# Patient Record
Sex: FEMALE | Race: OTHER | NOT HISPANIC OR LATINO
[De-identification: names, ages, dates, MRNs, and addresses within clinical notes are randomized per-mention and may not be internally consistent; named-entity substitution may affect disease eponyms.]

---

## 2017-05-26 ENCOUNTER — TRANSCRIPTION ENCOUNTER (OUTPATIENT)
Age: 82
End: 2017-05-26

## 2017-05-26 ENCOUNTER — INPATIENT (INPATIENT)
Facility: HOSPITAL | Age: 82
LOS: 3 days | Discharge: HOME | End: 2017-05-30
Attending: INTERNAL MEDICINE | Admitting: INTERNAL MEDICINE

## 2017-05-26 ENCOUNTER — RESULT REVIEW (OUTPATIENT)
Age: 82
End: 2017-05-26

## 2017-05-26 ENCOUNTER — APPOINTMENT (OUTPATIENT)
Dept: INTERVENTIONAL RADIOLOGY/VASCULAR | Facility: HOSPITAL | Age: 82
End: 2017-05-26

## 2017-05-26 ENCOUNTER — OUTPATIENT (OUTPATIENT)
Dept: OUTPATIENT SERVICES | Facility: HOSPITAL | Age: 82
LOS: 1 days | Discharge: HOME | End: 2017-05-26
Payer: OTHER MISCELLANEOUS

## 2017-05-26 VITALS
OXYGEN SATURATION: 93 % | RESPIRATION RATE: 16 BRPM | TEMPERATURE: 99 F | HEIGHT: 57 IN | DIASTOLIC BLOOD PRESSURE: 80 MMHG | WEIGHT: 99.21 LBS | SYSTOLIC BLOOD PRESSURE: 157 MMHG | HEART RATE: 92 BPM

## 2017-05-26 DIAGNOSIS — Z43.1 ENCOUNTER FOR ATTENTION TO GASTROSTOMY: ICD-10-CM

## 2017-05-26 PROBLEM — Z00.00 ENCOUNTER FOR PREVENTIVE HEALTH EXAMINATION: Status: ACTIVE | Noted: 2017-05-26

## 2017-05-26 LAB
ANION GAP SERPL CALC-SCNC: 9 MMOL/L — SIGNIFICANT CHANGE UP (ref 5–17)
BUN SERPL-MCNC: 46 MG/DL — HIGH (ref 7–23)
CALCIUM SERPL-MCNC: 9.4 MG/DL — SIGNIFICANT CHANGE UP (ref 8.5–10.1)
CHLORIDE SERPL-SCNC: 107 MMOL/L — SIGNIFICANT CHANGE UP (ref 96–108)
CO2 SERPL-SCNC: 29 MMOL/L — SIGNIFICANT CHANGE UP (ref 22–31)
CREAT SERPL-MCNC: 0.97 MG/DL — SIGNIFICANT CHANGE UP (ref 0.5–1.3)
GLUCOSE SERPL-MCNC: 120 MG/DL — HIGH (ref 70–99)
HCT VFR BLD CALC: 33.2 % — LOW (ref 34.5–45)
HGB BLD-MCNC: 11.3 G/DL — LOW (ref 11.5–15.5)
INR BLD: 1.16 RATIO — SIGNIFICANT CHANGE UP (ref 0.88–1.16)
MCHC RBC-ENTMCNC: 31.2 PG — SIGNIFICANT CHANGE UP (ref 27–34)
MCHC RBC-ENTMCNC: 33.9 GM/DL — SIGNIFICANT CHANGE UP (ref 32–36)
MCV RBC AUTO: 92 FL — SIGNIFICANT CHANGE UP (ref 80–100)
PLATELET # BLD AUTO: 224 K/UL — SIGNIFICANT CHANGE UP (ref 150–400)
POTASSIUM SERPL-MCNC: 3.9 MMOL/L — SIGNIFICANT CHANGE UP (ref 3.5–5.3)
POTASSIUM SERPL-SCNC: 3.9 MMOL/L — SIGNIFICANT CHANGE UP (ref 3.5–5.3)
PROTHROM AB SERPL-ACNC: 12.7 SEC — SIGNIFICANT CHANGE UP (ref 9.8–12.7)
RBC # BLD: 3.61 M/UL — LOW (ref 3.8–5.2)
RBC # FLD: 14 % — SIGNIFICANT CHANGE UP (ref 11–15)
SODIUM SERPL-SCNC: 145 MMOL/L — SIGNIFICANT CHANGE UP (ref 135–145)
WBC # BLD: 9.6 K/UL — SIGNIFICANT CHANGE UP (ref 3.8–10.5)
WBC # FLD AUTO: 9.6 K/UL — SIGNIFICANT CHANGE UP (ref 3.8–10.5)

## 2017-05-26 PROCEDURE — 49450 REPLACE G/C TUBE PERC: CPT

## 2017-05-26 RX ORDER — HYDROMORPHONE HYDROCHLORIDE 2 MG/ML
2 INJECTION INTRAMUSCULAR; INTRAVENOUS; SUBCUTANEOUS EVERY 4 HOURS
Qty: 0 | Refills: 0 | Status: DISCONTINUED | OUTPATIENT
Start: 2017-05-26 | End: 2017-05-30

## 2017-05-26 RX ORDER — ROBINUL 0.2 MG/ML
0.2 INJECTION INTRAMUSCULAR; INTRAVENOUS EVERY 8 HOURS
Qty: 0 | Refills: 0 | Status: DISCONTINUED | OUTPATIENT
Start: 2017-05-26 | End: 2017-05-30

## 2017-05-26 RX ORDER — NYSTATIN CREAM 100000 [USP'U]/G
1 CREAM TOPICAL
Qty: 0 | Refills: 0 | Status: DISCONTINUED | OUTPATIENT
Start: 2017-05-26 | End: 2017-05-30

## 2017-05-26 RX ORDER — ACETAMINOPHEN 500 MG
650 TABLET ORAL EVERY 6 HOURS
Qty: 0 | Refills: 0 | Status: DISCONTINUED | OUTPATIENT
Start: 2017-05-26 | End: 2017-05-27

## 2017-05-26 RX ORDER — SODIUM CHLORIDE 9 MG/ML
1000 INJECTION, SOLUTION INTRAVENOUS
Qty: 0 | Refills: 0 | Status: DISCONTINUED | OUTPATIENT
Start: 2017-05-26 | End: 2017-05-26

## 2017-05-26 RX ADMIN — NYSTATIN CREAM 1 APPLICATION(S): 100000 CREAM TOPICAL at 17:14

## 2017-05-26 RX ADMIN — SODIUM CHLORIDE 73 MILLILITER(S): 9 INJECTION, SOLUTION INTRAVENOUS at 15:36

## 2017-05-26 NOTE — PROGRESS NOTE ADULT - ASSESSMENT
84 year old female transferrered from [facility] for replacement of of PEG tube.  Patietn was apparently seen in IR and there was a complication involving the tube.  Patient is being admitted for retrieval of broken piece and replacement of  tube.  No medical contraindication to proposed procedure.

## 2017-05-26 NOTE — PROGRESS NOTE ADULT - SUBJECTIVE AND OBJECTIVE BOX
Upper esophagogastroduodenoscopy/ENDOSCOPY  with foreign body removal    -Informed consent obtained from patient prior to exam.     Indication:   Foreign body in stomach    Anesthesia: as per anesthesia  provided by:    [employer]:  RAMÍREZ    Stomach:   Foreign body s/p removal    Duodenum:   WNL    The patient tolerated the procedure well.    Findings:  as above    Plan:  Restart feeds

## 2017-05-26 NOTE — CONSULT NOTE ADULT - SUBJECTIVE AND OBJECTIVE BOX
HPI:  Senile degeneration of brain  Severe sepsis with septic shock    PAST MEDICAL & SURGICAL HISTORY:  Diabetes  COPD (chronic obstructive pulmonary disease)      MEDICATIONS  (STANDING):  nystatin Powder 1Application(s) Topical two times a day    MEDICATIONS  (PRN):  HYDROmorphone   Tablet 2milliGRAM(s) Oral every 4 hours PRN pain  acetaminophen  Suppository 650milliGRAM(s) Rectal every 6 hours PRN For Temp greater than 38 C (100.4 F)  LORazepam   Injectable 0.25milliGRAM(s) IV Push every 12 hours PRN Agitation  glycopyrrolate Injectable 0.2milliGRAM(s) IV Push every 8 hours PRN increased secretions      Allergies    codeine (Rash)    Intolerances        FAMILY HISTORY:      REVIEW OF SYSTEMS:    CONSTITUTIONAL: No fever, weight loss, or fatigue  EYES: No eye pain, visual disturbances, or discharge  ENMT:  No difficulty hearing, tinnitus, vertigo; No sinus or throat pain  NECK: No pain or stiffness  BREASTS: No pain, masses, or nipple discharge  RESPIRATORY: No cough, wheezing, chills or hemoptysis; No shortness of breath  CARDIOVASCULAR: No chest pain, palpitations, dizziness, or leg swelling  GASTROINTESTINAL: No abdominal or epigastric pain. No nausea, vomiting, or hematemesis; No diarrhea or constipation. No melena or hematochezia.  GENITOURINARY: No dysuria, frequency, hematuria, or incontinence  NEUROLOGICAL: No headaches, memory loss, loss of strength, numbness, or tremors  SKIN: No itching, burning, rashes, or lesions   LYMPH NODES: No enlarged glands  ENDOCRINE: No heat or cold intolerance; No hair loss  MUSCULOSKELETAL: No joint pain or swelling; No muscle, back, or extremity pain  PSYCHIATRIC: No depression, anxiety, mood swings, or difficulty sleeping  HEME/LYMPH: No easy bruising, or bleeding gums  ALLERGY AND IMMUNOLOGIC: No hives or eczema          SOCIAL HISTORY:    FAMILY HISTORY:      Vital Signs Last 24 Hrs  T(C): 37.2, Max: 37.2 (05-26 @ 11:51)  T(F): 99, Max: 99 (05-26 @ 11:51)  HR: 92 (92 - 92)  BP: 157/80 (157/80 - 157/80)  BP(mean): --  RR: 16 (16 - 16)  SpO2: 93% (93% - 93%)    PHYSICAL EXAM:    GENERAL: NAD, well-groomed, well-developed  HEAD:  Atraumatic, Normocephalic  EYES: EOMI, PERRLA, conjunctiva and sclera clear  ENMT: No tonsillar erythema, exudates, or enlargement; Moist mucous membranes, Good dentition, No lesions  NECK: Supple, No JVD, Normal thyroid  NERVOUS SYSTEM:  Alert & Oriented X3, Good concentration; Motor Strength 5/5 B/L upper and lower extremities; DTRs 2+ intact and symmetric  CHEST/LUNG: Clear to percussion bilaterally; No rales, rhonchi, wheezing, or rubs  HEART: Regular rate and rhythm; No murmurs, rubs, or gallops  ABDOMEN: Soft, Nontender, Nondistended; Bowel sounds present  EXTREMITIES:  2+ Peripheral Pulses, No clubbing, cyanosis, or edema  LYMPH: No lymphadenopathy noted   RECTAL: No masses, prostate normal size and smooth, Guaiaci negative   BREAST: No palpable masses, skin no lesions no retractions, no discharges. adnexal no palpable masses noted   GYN: uterus normal size, adnexal, no palpable masses, no CMT, no uterine discharge   SKIN: No rashes or lesions    LABS:                        11.3   9.6   )-----------( 224      ( 26 May 2017 12:33 )             33.2       CBC:  05-26 @ 12:33  WBC  9.6  HGB 11.3  HCT 33.2 Plate 224  MCV 92.0           26 May 2017 12:33    145    |  107    |  46     ----------------------------<  120    3.9     |  29     |  0.97     Ca    9.4        26 May 2017 12:33      PT/INR - ( 26 May 2017 12:33 )   PT: 12.7 sec;   INR: 1.16 ratio                 RADIOLOGY & ADDITIONAL STUDIES: HPI:  Senile degeneration of brain  Severe sepsis with septic shock    The patient was having her original PEG tube removed when the tube broke and the bumper fell into the stomach.    PAST MEDICAL & SURGICAL HISTORY:  Diabetes  COPD (chronic obstructive pulmonary disease)      MEDICATIONS  (STANDING):  nystatin Powder 1Application(s) Topical two times a day    MEDICATIONS  (PRN):  HYDROmorphone   Tablet 2milliGRAM(s) Oral every 4 hours PRN pain  acetaminophen  Suppository 650milliGRAM(s) Rectal every 6 hours PRN For Temp greater than 38 C (100.4 F)  LORazepam   Injectable 0.25milliGRAM(s) IV Push every 12 hours PRN Agitation  glycopyrrolate Injectable 0.2milliGRAM(s) IV Push every 8 hours PRN increased secretions      Allergies    codeine (Rash)    Intolerances        FAMILY HISTORY:      REVIEW OF SYSTEMS:  Patient not communicative.    CONSTITUTIONAL: No fever, weight loss, or fatigue  EYES: No eye pain, visual disturbances, or discharge  ENMT:  No difficulty hearing, tinnitus, vertigo; No sinus or throat pain  NECK: No pain or stiffness  BREASTS: No pain, masses, or nipple discharge  RESPIRATORY: No cough, wheezing, chills or hemoptysis; No shortness of breath  CARDIOVASCULAR: No chest pain, palpitations, dizziness, or leg swelling  GASTROINTESTINAL: No abdominal or epigastric pain. No nausea, vomiting, or hematemesis; No diarrhea or constipation. No melena or hematochezia.  GENITOURINARY: No dysuria, frequency, hematuria, or incontinence  NEUROLOGICAL: No headaches, memory loss, loss of strength, numbness, or tremors  SKIN: No itching, burning, rashes, or lesions   LYMPH NODES: No enlarged glands  ENDOCRINE: No heat or cold intolerance; No hair loss  MUSCULOSKELETAL: No joint pain or swelling; No muscle, back, or extremity pain  PSYCHIATRIC: No depression, anxiety, mood swings, or difficulty sleeping  HEME/LYMPH: No easy bruising, or bleeding gums  ALLERGY AND IMMUNOLOGIC: No hives or eczema          SOCIAL HISTORY:    FAMILY HISTORY:      Vital Signs Last 24 Hrs  T(C): 37.2, Max: 37.2 (05-26 @ 11:51)  T(F): 99, Max: 99 (05-26 @ 11:51)  HR: 92 (92 - 92)  BP: 157/80 (157/80 - 157/80)  BP(mean): --  RR: 16 (16 - 16)  SpO2: 93% (93% - 93%)    PHYSICAL EXAM:    GENERAL: NAD, well-groomed, well-developed  HEAD:  Atraumatic, Normocephalic  EYES: EOMI, PERRLA, conjunctiva and sclera clear  ENMT: No tonsillar erythema, exudates, or enlargement; Moist mucous membranes, Good dentition, No lesions  NECK: Supple, No JVD, Normal thyroid  NERVOUS SYSTEM:  Alert & Oriented X3, Good concentration; Motor Strength 5/5 B/L upper and lower extremities; DTRs 2+ intact and symmetric  CHEST/LUNG: Clear to percussion bilaterally; No rales, rhonchi, wheezing, or rubs  HEART: Regular rate and rhythm; No murmurs, rubs, or gallops  ABDOMEN: Soft, Nontender, Nondistended; Bowel sounds present  EXTREMITIES:  2+ Peripheral Pulses, No clubbing, cyanosis, or edema  LYMPH: No lymphadenopathy noted   RECTAL: Deferred  BREAST: No palpable masses, skin no lesions    SKIN: No rashes or lesions    LABS:                        11.3   9.6   )-----------( 224      ( 26 May 2017 12:33 )             33.2       CBC:  05-26 @ 12:33  WBC  9.6  HGB 11.3  HCT 33.2 Plate 224  MCV 92.0           26 May 2017 12:33    145    |  107    |  46     ----------------------------<  120    3.9     |  29     |  0.97     Ca    9.4        26 May 2017 12:33      PT/INR - ( 26 May 2017 12:33 )   PT: 12.7 sec;   INR: 1.16 ratio                 RADIOLOGY & ADDITIONAL STUDIES:

## 2017-05-26 NOTE — PROGRESS NOTE ADULT - SUBJECTIVE AND OBJECTIVE BOX
LUPE ORONA                    84y  Female    Allergies    codeine (Rash)    Intolerances        Symptoms:  Pain (1-10):  Dyspnea:  Nausea/Vomiting:  Secretions:   Agitation:  Symptom Requiring Inpatient Hospice Admission:    Overnight events/interim history:    HPI:          PPSV2:     Code Status:          MEDICATIONS  (STANDING):    MEDICATIONS  (PRN):                             Vital Signs Last 24 Hrs  T(C): --  T(F): --  HR: --  BP: --  BP(mean): --  RR: --  SpO2: --    PHYSICAL EXAM:      Constitutional:    Eyes:    ENMT:    Neck:    Breasts:    Back:    Respiratory:    Cardiovascular:    Gastrointestinal:    Genitourinary:    Rectal:    Extremities:    Vascular:    Neurological:    Skin:    Lymph Nodes:    Musculoskeletal:    Psychiatric:

## 2017-05-27 DIAGNOSIS — F03.90 UNSPECIFIED DEMENTIA, UNSPECIFIED SEVERITY, WITHOUT BEHAVIORAL DISTURBANCE, PSYCHOTIC DISTURBANCE, MOOD DISTURBANCE, AND ANXIETY: ICD-10-CM

## 2017-05-27 DIAGNOSIS — T18.2XXA FOREIGN BODY IN STOMACH, INITIAL ENCOUNTER: ICD-10-CM

## 2017-05-27 DIAGNOSIS — R50.9 FEVER, UNSPECIFIED: ICD-10-CM

## 2017-05-27 RX ORDER — SENNA PLUS 8.6 MG/1
10 TABLET ORAL
Qty: 0 | Refills: 0 | Status: DISCONTINUED | OUTPATIENT
Start: 2017-05-27 | End: 2017-05-30

## 2017-05-27 RX ORDER — ROBINUL 0.2 MG/ML
0.2 INJECTION INTRAMUSCULAR; INTRAVENOUS
Qty: 0 | Refills: 0 | Status: DISCONTINUED | OUTPATIENT
Start: 2017-05-27 | End: 2017-05-29

## 2017-05-27 RX ORDER — ACETAMINOPHEN 500 MG
650 TABLET ORAL EVERY 6 HOURS
Qty: 0 | Refills: 0 | Status: DISCONTINUED | OUTPATIENT
Start: 2017-05-27 | End: 2017-05-30

## 2017-05-27 RX ORDER — INSULIN GLARGINE 100 [IU]/ML
10 INJECTION, SOLUTION SUBCUTANEOUS EVERY MORNING
Qty: 0 | Refills: 0 | Status: DISCONTINUED | OUTPATIENT
Start: 2017-05-27 | End: 2017-05-30

## 2017-05-27 RX ADMIN — NYSTATIN CREAM 1 APPLICATION(S): 100000 CREAM TOPICAL at 05:35

## 2017-05-27 RX ADMIN — NYSTATIN CREAM 1 APPLICATION(S): 100000 CREAM TOPICAL at 17:25

## 2017-05-27 RX ADMIN — SENNA PLUS 10 MILLILITER(S): 8.6 TABLET ORAL at 18:26

## 2017-05-27 RX ADMIN — Medication 650 MILLIGRAM(S): at 05:57

## 2017-05-27 RX ADMIN — INSULIN GLARGINE 10 UNIT(S): 100 INJECTION, SOLUTION SUBCUTANEOUS at 09:21

## 2017-05-27 NOTE — H&P ADULT - HISTORY OF PRESENT ILLNESS
84 year old female with dementia and history of sepsis brought in for removal of foreign body from stomach and replacement of PEG tube.

## 2017-05-27 NOTE — PROGRESS NOTE ADULT - ASSESSMENT
84 year old with dementia and history of sepsis brought in for PEG change which was done but patient spiking fevers to 101 post procedure.  On Levaquin

## 2017-05-27 NOTE — PROGRESS NOTE ADULT - SUBJECTIVE AND OBJECTIVE BOX
LUPE ORONA                    84y  Female    Allergies    codeine (Rash)    Intolerances        Symptoms:  Pain (1-10):  none  Dyspnea: no  Nausea/Vomiting: no  Secretions:  no  Agitation: no  Symptom Requiring Inpatient Hospice Admission: fever post PEG change    Overnight events/interim history:    HPI:  84 year old female with dementia and history of sepsis brought in for removal of foreign body from stomach and replacement of PEG tube. (27 May 2017 06:21)          PPSV2:     Code Status:  DNR          MEDICATIONS  (STANDING):  nystatin Powder 1Application(s) Topical two times a day  levoFLOXacin  Tablet 250milliGRAM(s) Oral every 24 hours  insulin glargine Injectable (LANTUS) 10Unit(s) SubCutaneous every morning  senna Syrup 10milliLiter(s) Oral two times a day    MEDICATIONS  (PRN):  HYDROmorphone   Tablet 2milliGRAM(s) Oral every 4 hours PRN pain  glycopyrrolate Injectable 0.2milliGRAM(s) IV Push every 8 hours PRN increased secretions  acetaminophen    Suspension 650milliGRAM(s) Oral every 6 hours PRN For Temp greater than 38 C (100.4 F)  LORazepam    Concentrate 0.25milliGRAM(s) Oral two times a day PRN Agitation  glycopyrrolate 0.2milliGRAM(s) Oral two times a day PRN increased secretions  sodium biphosphate Rectal Enema 1Enema Rectal daily PRN constipation      CBC Full  -  ( 26 May 2017 12:33 )  WBC Count : 9.6 K/uL  Hemoglobin : 11.3 g/dL  Hematocrit : 33.2 %  Platelet Count - Automated : 224 K/uL  Mean Cell Volume : 92.0 fl  Mean Cell Hemoglobin : 31.2 pg  Mean Cell Hemoglobin Concentration : 33.9 gm/dL  Auto Neutrophil # : x  Auto Lymphocyte # : x  Auto Monocyte # : x  Auto Eosinophil # : x  Auto Basophil # : x  Auto Neutrophil % : x  Auto Lymphocyte % : x  Auto Monocyte % : x  Auto Eosinophil % : x  Auto Basophil % : x                         Vital Signs Last 24 Hrs  T(C): 38.7, Max: 38.7 (05-27 @ 07:29)  T(F): 101.7, Max: 101.7 (05-27 @ 07:29)  HR: 90 (68 - 92)  BP: 154/82 (91/48 - 186/93)  BP(mean): --  RR: 17 (16 - 26)  SpO2: 97% (93% - 100%)    PHYSICAL EXAM:      Constitutional: frail    Eyes: shut    ENMT: wnl    Neck: supple    Breasts: not examined    Back: reddened    Respiratory: grossly clear    Cardiovascular: regular    Gastrointestinal: PEG in place    Genitourinary: not examined    Rectal: not examined    Extremities: wnl    Vascular: wnl    Neurological: lethargic non verbal    Skin: dry    Lymph Nodes: none palpable    Musculoskeletal: weakness    Psychiatric:NA

## 2017-05-28 LAB
HBA1C BLD-MCNC: 6.1 % — HIGH (ref 4–5.6)
HCT VFR BLD CALC: 30.7 % — LOW (ref 34.5–45)
HGB BLD-MCNC: 10.5 G/DL — LOW (ref 11.5–15.5)
MCHC RBC-ENTMCNC: 31.5 PG — SIGNIFICANT CHANGE UP (ref 27–34)
MCHC RBC-ENTMCNC: 34.1 GM/DL — SIGNIFICANT CHANGE UP (ref 32–36)
MCV RBC AUTO: 92.3 FL — SIGNIFICANT CHANGE UP (ref 80–100)
PLATELET # BLD AUTO: 240 K/UL — SIGNIFICANT CHANGE UP (ref 150–400)
RBC # BLD: 3.33 M/UL — LOW (ref 3.8–5.2)
RBC # FLD: 13.8 % — SIGNIFICANT CHANGE UP (ref 11–15)
WBC # BLD: 10.7 K/UL — HIGH (ref 3.8–10.5)
WBC # FLD AUTO: 10.7 K/UL — HIGH (ref 3.8–10.5)

## 2017-05-28 RX ADMIN — NYSTATIN CREAM 1 APPLICATION(S): 100000 CREAM TOPICAL at 05:20

## 2017-05-28 RX ADMIN — NYSTATIN CREAM 1 APPLICATION(S): 100000 CREAM TOPICAL at 17:45

## 2017-05-28 RX ADMIN — SENNA PLUS 10 MILLILITER(S): 8.6 TABLET ORAL at 17:43

## 2017-05-28 RX ADMIN — Medication 650 MILLIGRAM(S): at 07:56

## 2017-05-28 RX ADMIN — SENNA PLUS 10 MILLILITER(S): 8.6 TABLET ORAL at 05:19

## 2017-05-28 RX ADMIN — INSULIN GLARGINE 10 UNIT(S): 100 INJECTION, SOLUTION SUBCUTANEOUS at 08:26

## 2017-05-28 RX ADMIN — Medication 650 MILLIGRAM(S): at 14:12

## 2017-05-28 NOTE — PROGRESS NOTE ADULT - SUBJECTIVE AND OBJECTIVE BOX
LUPE ORONA                    84y  Female    Allergies    codeine (Rash)    Intolerances        Symptoms:  Pain (1-10): no  Dyspnea:  no  Nausea/Vomiting: no  Secretions: no  Agitation: no  Symptom Requiring Inpatient Hospice Admission: recurrent fevers    Overnight events/interim history:    HPI:  84 year old female with dementia and history of sepsis brought in for removal of foreign body from stomach and replacement of PEG tube. (27 May 2017 06:21)          PPSV2:     Code Status: DNR          MEDICATIONS  (STANDING):  nystatin Powder 1Application(s) Topical two times a day  levoFLOXacin  Tablet 250milliGRAM(s) Oral every 24 hours  insulin glargine Injectable (LANTUS) 10Unit(s) SubCutaneous every morning  senna Syrup 10milliLiter(s) Oral two times a day    MEDICATIONS  (PRN):  HYDROmorphone   Tablet 2milliGRAM(s) Oral every 4 hours PRN pain  glycopyrrolate Injectable 0.2milliGRAM(s) IV Push every 8 hours PRN increased secretions  acetaminophen    Suspension 650milliGRAM(s) Oral every 6 hours PRN For Temp greater than 38 C (100.4 F)  LORazepam    Concentrate 0.25milliGRAM(s) Oral two times a day PRN Agitation  glycopyrrolate 0.2milliGRAM(s) Oral two times a day PRN increased secretions  sodium biphosphate Rectal Enema 1Enema Rectal daily PRN constipation      CBC Full  -  ( 26 May 2017 12:33 )  WBC Count : 9.6 K/uL  Hemoglobin : 11.3 g/dL  Hematocrit : 33.2 %  Platelet Count - Automated : 224 K/uL  Mean Cell Volume : 92.0 fl  Mean Cell Hemoglobin : 31.2 pg  Mean Cell Hemoglobin Concentration : 33.9 gm/dL  Auto Neutrophil # : x  Auto Lymphocyte # : x  Auto Monocyte # : x  Auto Eosinophil # : x  Auto Basophil # : x  Auto Neutrophil % : x  Auto Lymphocyte % : x  Auto Monocyte % : x  Auto Eosinophil % : x  Auto Basophil % : x                         Vital Signs Last 24 Hrs  T(C): 38.4, Max: 38.4 (05-28 @ 07:47)  T(F): 101.2, Max: 101.2 (05-28 @ 07:47)  HR: 85 (78 - 85)  BP: 154/73 (154/73 - 156/88)  BP(mean): --  RR: 16 (16 - 16)  SpO2: 100% (99% - 100%)    PHYSICAL EXAM:      Constitutional:  frail    Eyes: shut    ENMT: dry    Neck: contracted    Breasts:  not examined    Back: wnl    Respiratory: grossly clear    Cardiovascular:regular    Gastrointestinal: PEG in place    Genitourinary: not examined    Rectal: not examined    Extremities: contracted    Vascular:    Neurological: lethargic    Skin: dry    Lymph Nodes: none palpable    Musculoskeletal: weakness    Psychiatric: NA

## 2017-05-29 RX ADMIN — SENNA PLUS 10 MILLILITER(S): 8.6 TABLET ORAL at 05:21

## 2017-05-29 RX ADMIN — NYSTATIN CREAM 1 APPLICATION(S): 100000 CREAM TOPICAL at 17:20

## 2017-05-29 RX ADMIN — NYSTATIN CREAM 1 APPLICATION(S): 100000 CREAM TOPICAL at 05:21

## 2017-05-29 RX ADMIN — INSULIN GLARGINE 10 UNIT(S): 100 INJECTION, SOLUTION SUBCUTANEOUS at 08:00

## 2017-05-29 RX ADMIN — SENNA PLUS 10 MILLILITER(S): 8.6 TABLET ORAL at 17:29

## 2017-05-29 NOTE — PROGRESS NOTE ADULT - SUBJECTIVE AND OBJECTIVE BOX
LUPE ORONA                    84y  Female    Allergies    codeine (Rash)    Intolerances        Symptoms:  Pain (1-10): no  Dyspnea: on oxygen  Nausea/Vomiting: no  Secretions:  no  Agitation: no  Symptom Requiring Inpatient Hospice Admission: was febrile after procedure     Overnight events/interim history:    HPI:  84 year old female with dementia and history of sepsis brought in for removal of foreign body from stomach and replacement of PEG tube. (27 May 2017 06:21)          PPSV2:     Code Status: DNR          MEDICATIONS  (STANDING):  nystatin Powder 1Application(s) Topical two times a day  levoFLOXacin  Tablet 250milliGRAM(s) Oral every 24 hours  insulin glargine Injectable (LANTUS) 10Unit(s) SubCutaneous every morning  senna Syrup 10milliLiter(s) Oral two times a day    MEDICATIONS  (PRN):  HYDROmorphone   Tablet 2milliGRAM(s) Oral every 4 hours PRN pain  glycopyrrolate Injectable 0.2milliGRAM(s) IV Push every 8 hours PRN increased secretions  acetaminophen    Suspension 650milliGRAM(s) Oral every 6 hours PRN For Temp greater than 38 C (100.4 F)  LORazepam    Concentrate 0.25milliGRAM(s) Oral two times a day PRN Agitation  sodium biphosphate Rectal Enema 1Enema Rectal daily PRN constipation      CBC Full  -  ( 28 May 2017 10:14 )  WBC Count : 10.7 K/uL  Hemoglobin : 10.5 g/dL  Hematocrit : 30.7 %  Platelet Count - Automated : 240 K/uL  Mean Cell Volume : 92.3 fl  Mean Cell Hemoglobin : 31.5 pg  Mean Cell Hemoglobin Concentration : 34.1 gm/dL  Auto Neutrophil # : x  Auto Lymphocyte # : x  Auto Monocyte # : x  Auto Eosinophil # : x  Auto Basophil # : x  Auto Neutrophil % : x  Auto Lymphocyte % : x  Auto Monocyte % : x  Auto Eosinophil % : x  Auto Basophil % : x                         Vital Signs Last 24 Hrs  T(C): 37.2, Max: 38.2 (05-28 @ 12:17)  T(F): 98.9, Max: 100.8 (05-28 @ 12:17)  HR: 91 (69 - 91)  BP: 165/88 (159/71 - 165/88)  BP(mean): --  RR: 18 (18 - 18)  SpO2: 99% (99% - 99%)    PHYSICAL EXAM:      Constitutional: frail    Eyes:  shut    ENMT: dry    Neck: supple    Breasts: not examined    Back: reddened    Respiratory: few coarsely transmitted breath sounds    Cardiovascular: regulaar    Gastrointestinal: abdomen soft PEG in place    Genitourinary: pop    Rectal:  not examined    Extremities: contracted    Vascular: not examined    Neurological: dementia    Skin: dry    Lymph Nodes: none palpable    Musculoskeletal: no strength    Psychiatric NA

## 2017-05-30 VITALS
OXYGEN SATURATION: 100 % | HEART RATE: 83 BPM | DIASTOLIC BLOOD PRESSURE: 82 MMHG | SYSTOLIC BLOOD PRESSURE: 147 MMHG | TEMPERATURE: 99 F | RESPIRATION RATE: 18 BRPM

## 2017-05-30 RX ORDER — NYSTATIN CREAM 100000 [USP'U]/G
1 CREAM TOPICAL
Qty: 0 | Refills: 0 | COMMUNITY
Start: 2017-05-30

## 2017-05-30 RX ORDER — HYDROMORPHONE HYDROCHLORIDE 2 MG/ML
2 INJECTION INTRAMUSCULAR; INTRAVENOUS; SUBCUTANEOUS
Qty: 0 | Refills: 0 | COMMUNITY
Start: 2017-05-30

## 2017-05-30 RX ORDER — INSULIN GLARGINE 100 [IU]/ML
10 INJECTION, SOLUTION SUBCUTANEOUS
Qty: 0 | Refills: 0 | COMMUNITY
Start: 2017-05-30

## 2017-05-30 RX ORDER — ACETAMINOPHEN 500 MG
650 TABLET ORAL
Qty: 0 | Refills: 0 | COMMUNITY
Start: 2017-05-30

## 2017-05-30 RX ORDER — SENNA PLUS 8.6 MG/1
5 TABLET ORAL
Qty: 0 | Refills: 0 | COMMUNITY
Start: 2017-05-30

## 2017-05-30 RX ORDER — ROBINUL 0.2 MG/ML
0.4 INJECTION INTRAMUSCULAR; INTRAVENOUS
Qty: 0 | Refills: 0 | COMMUNITY
Start: 2017-05-30

## 2017-05-30 RX ADMIN — SENNA PLUS 10 MILLILITER(S): 8.6 TABLET ORAL at 05:15

## 2017-05-30 RX ADMIN — INSULIN GLARGINE 10 UNIT(S): 100 INJECTION, SOLUTION SUBCUTANEOUS at 08:00

## 2017-05-30 RX ADMIN — NYSTATIN CREAM 1 APPLICATION(S): 100000 CREAM TOPICAL at 05:16

## 2017-05-30 NOTE — DISCHARGE NOTE ADULT - MEDICATION SUMMARY - MEDICATIONS TO CHANGE
I will SWITCH the dose or number of times a day I take the medications listed below when I get home from the hospital:    glycopyrrolate 1 mg/5 mL oral solution  -- 0.04 mg/kg by mouth 2 times a day, As Needed MDD:2ml  -- Check with your doctor before becoming pregnant.  May cause drowsiness.  Alcohol may intensify this effect.  Use care when operating dangerous machinery.  Obtain medical advice before taking any non-prescription drugs as some may affect the action of this medication.  Take medication on an empty stomach 1 hour before or 2 to 3 hours after a meal unless otherwise directed by your doctor.  This drug may impair the ability to drive or operate machinery.  Use care until you become familiar with its effects.

## 2017-05-30 NOTE — DISCHARGE NOTE ADULT - MEDICATION SUMMARY - MEDICATIONS TO TAKE
I will START or STAY ON the medications listed below when I get home from the hospital:    Dilaudid 2 mg oral tablet  --  by gastrostomy tube , As Needed for moderate pain 4/10-7/10  -- Indication: For For moderate pain as needed    Children's Pain & Fever 160 mg/5 mL oral suspension  -- 650 milligram(s) by mouth every 6 hours, As Needed fever > 100.4  -- Indication: For  for temp > 100.4 if needed    LORazepam 2 mg/mL oral concentrate  -- 0.25 milliliter(s) by gastrostomy tube every 12 hours, As Needed for agitation  -- Indication: For For agitatation as needed    Lantus 100 units/mL subcutaneous solution  -- 10 unit(s) subcutaneous once a day  -- Indication: For For diabetes    nystatin 100,000 units/g topical powder  -- Apply on skin to affected area 2 times a day to area under breasts  -- Indication: For For rash under breasts, as needed    glycopyrrolate  -- 0.4 milligram(s) by gastrostomy tube 2 times a day, As Needed  -- Indication: For For excessive terminal     senna leaf extract 176 mg/5 mL oral syrup  -- 5 milliliter(s) by gastrostomy tube 2 times a day  -- Indication: For For constipation

## 2017-05-30 NOTE — DISCHARGE NOTE ADULT - CARE PROVIDER_API CALL
Bob Bateman), Physician  NPs  [address] [place] [address]  Phone: [phone]  Fax: [phone]    Genia Gomez ([facility]), Physician  NPs  [address] [place] [address]  Phone: [phone]  Fax: [phone]

## 2017-05-30 NOTE — DISCHARGE NOTE ADULT - HOSPITAL COURSE
84 year old female with dementia and history of sepsis brought in to [facility] for removal of foreign body from stomach and replacement of PEG tube.

## 2017-05-30 NOTE — DISCHARGE NOTE ADULT - CARE PLAN
Principal Discharge DX:	Dementia  Goal:	You will be comfortable at home  Instructions for follow-up, activity and diet:	Hospice services continue for you at home.  Secondary Diagnosis:	Foreign body in stomach

## 2017-05-30 NOTE — DISCHARGE NOTE ADULT - PATIENT PORTAL LINK FT
“You can access the [facility] Patient Portal, offered by [facility], by registering with the following website: [email or web address]”

## 2017-05-30 NOTE — DISCHARGE NOTE ADULT - MEDICATION SUMMARY - MEDICATIONS TO STOP TAKING
I will STOP taking the medications listed below when I get home from the hospital:    methadone 10 mg/5 mL oral solution  -- 2.5 milliliter(s) by gastrostomy tube every 12 hours MDD:5ml

## 2017-05-31 LAB — SURGICAL PATHOLOGY FINAL REPORT - CH: SIGNIFICANT CHANGE UP

## 2017-06-05 DIAGNOSIS — J44.9 CHRONIC OBSTRUCTIVE PULMONARY DISEASE, UNSPECIFIED: ICD-10-CM

## 2017-06-05 DIAGNOSIS — E11.9 TYPE 2 DIABETES MELLITUS WITHOUT COMPLICATIONS: ICD-10-CM
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
## 2017-06-05 DIAGNOSIS — Z88.5 ALLERGY STATUS TO NARCOTIC AGENT: ICD-10-CM

## 2017-06-05 DIAGNOSIS — T81.509A UNSPECIFIED COMPLICATION OF FOREIGN BODY ACCIDENTALLY LEFT IN BODY FOLLOWING UNSPECIFIED PROCEDURE, INITIAL ENCOUNTER: ICD-10-CM

## 2017-06-05 DIAGNOSIS — Z66 DO NOT RESUSCITATE: ICD-10-CM

## 2017-06-05 DIAGNOSIS — R50.82 POSTPROCEDURAL FEVER: ICD-10-CM

## 2017-06-05 DIAGNOSIS — Z28.21 IMMUNIZATION NOT CARRIED OUT BECAUSE OF PATIENT REFUSAL: ICD-10-CM

## 2017-06-05 DIAGNOSIS — T81.9XXA UNSPECIFIED COMPLICATION OF PROCEDURE, INITIAL ENCOUNTER: ICD-10-CM
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
## 2017-06-05 DIAGNOSIS — F03.90 UNSPECIFIED DEMENTIA, UNSPECIFIED SEVERITY, WITHOUT BEHAVIORAL DISTURBANCE, PSYCHOTIC DISTURBANCE, MOOD DISTURBANCE, AND ANXIETY: ICD-10-CM

## 2017-11-15 ENCOUNTER — EMERGENCY (EMERGENCY)
Facility: HOSPITAL | Age: 82
LOS: 1 days | Discharge: HOME | End: 2017-11-15
Attending: EMERGENCY MEDICINE
Payer: MEDICARE

## 2017-11-15 VITALS
RESPIRATION RATE: 18 BRPM | WEIGHT: 119.93 LBS | SYSTOLIC BLOOD PRESSURE: 174 MMHG | DIASTOLIC BLOOD PRESSURE: 74 MMHG | HEART RATE: 60 BPM | TEMPERATURE: 98 F | HEIGHT: 58 IN

## 2017-11-15 DIAGNOSIS — K94.29 OTHER COMPLICATIONS OF GASTROSTOMY: ICD-10-CM

## 2017-11-15 DIAGNOSIS — E11.9 TYPE 2 DIABETES MELLITUS WITHOUT COMPLICATIONS: ICD-10-CM

## 2017-11-15 DIAGNOSIS — J44.9 CHRONIC OBSTRUCTIVE PULMONARY DISEASE, UNSPECIFIED: ICD-10-CM

## 2017-11-15 DIAGNOSIS — Z79.4 LONG TERM (CURRENT) USE OF INSULIN: ICD-10-CM

## 2017-11-15 DIAGNOSIS — Z88.5 ALLERGY STATUS TO NARCOTIC AGENT: ICD-10-CM

## 2017-11-15 PROCEDURE — 99283 EMERGENCY DEPT VISIT LOW MDM: CPT | Mod: 25

## 2017-11-15 PROCEDURE — 43760: CPT

## 2017-11-15 PROCEDURE — 99284 EMERGENCY DEPT VISIT MOD MDM: CPT | Mod: GV

## 2017-11-15 PROCEDURE — 43760 CHANGE GASTROSTOMY TUBE PERCUTANEOUS W/O GUIDE: CPT

## 2017-11-15 PROCEDURE — 74000: CPT | Mod: 26

## 2017-11-15 PROCEDURE — 74018 RADEX ABDOMEN 1 VIEW: CPT

## 2017-11-15 NOTE — ED PROVIDER NOTE - OBJECTIVE STATEMENT
85 y/o F pt (on hospice) w/ PMH ox COPD and DM was sent to ED because her feeding tube stopped working yesterday. Pt's family denies abd pain, vomiting, or any other complaints. Pt is at baseline per family. Pt is allergic to Codeine (Rash).

## 2018-03-25 ENCOUNTER — INPATIENT (INPATIENT)
Facility: HOSPITAL | Age: 83
LOS: 1 days | DRG: 192 | End: 2018-03-27
Attending: INTERNAL MEDICINE | Admitting: INTERNAL MEDICINE
Payer: OTHER MISCELLANEOUS

## 2018-03-25 VITALS
HEIGHT: 59 IN | RESPIRATION RATE: 18 BRPM | HEART RATE: 82 BPM | DIASTOLIC BLOOD PRESSURE: 60 MMHG | OXYGEN SATURATION: 100 % | WEIGHT: 134.92 LBS | SYSTOLIC BLOOD PRESSURE: 150 MMHG

## 2018-03-25 DIAGNOSIS — R50.9 FEVER, UNSPECIFIED: ICD-10-CM

## 2018-03-25 LAB
ALBUMIN SERPL ELPH-MCNC: 2.8 G/DL — LOW (ref 3.5–5)
ALP SERPL-CCNC: 48 U/L — SIGNIFICANT CHANGE UP (ref 40–120)
ALT FLD-CCNC: 13 U/L DA — SIGNIFICANT CHANGE UP (ref 10–60)
ANION GAP SERPL CALC-SCNC: 0 MMOL/L — LOW (ref 5–17)
APPEARANCE UR: ABNORMAL
AST SERPL-CCNC: 25 U/L — SIGNIFICANT CHANGE UP (ref 10–40)
BASOPHILS # BLD AUTO: 0.1 K/UL — SIGNIFICANT CHANGE UP (ref 0–0.2)
BASOPHILS NFR BLD AUTO: 0.9 % — SIGNIFICANT CHANGE UP (ref 0–2)
BILIRUB SERPL-MCNC: 0.2 MG/DL — SIGNIFICANT CHANGE UP (ref 0.2–1.2)
BILIRUB UR-MCNC: NEGATIVE — SIGNIFICANT CHANGE UP
BUN SERPL-MCNC: 77 MG/DL — HIGH (ref 7–18)
CALCIUM SERPL-MCNC: 9.3 MG/DL — SIGNIFICANT CHANGE UP (ref 8.4–10.5)
CHLORIDE SERPL-SCNC: 98 MMOL/L — SIGNIFICANT CHANGE UP (ref 96–108)
CO2 SERPL-SCNC: 39 MMOL/L — HIGH (ref 22–31)
COLOR SPEC: YELLOW — SIGNIFICANT CHANGE UP
CREAT SERPL-MCNC: 1.11 MG/DL — SIGNIFICANT CHANGE UP (ref 0.5–1.3)
DIFF PNL FLD: ABNORMAL
EOSINOPHIL # BLD AUTO: 0.1 K/UL — SIGNIFICANT CHANGE UP (ref 0–0.5)
EOSINOPHIL NFR BLD AUTO: 0.4 % — SIGNIFICANT CHANGE UP (ref 0–6)
GLUCOSE SERPL-MCNC: 185 MG/DL — HIGH (ref 70–99)
GLUCOSE UR QL: NEGATIVE — SIGNIFICANT CHANGE UP
HCT VFR BLD CALC: 27.5 % — LOW (ref 34.5–45)
HGB BLD-MCNC: 8 G/DL — LOW (ref 11.5–15.5)
KETONES UR-MCNC: NEGATIVE — SIGNIFICANT CHANGE UP
LEUKOCYTE ESTERASE UR-ACNC: ABNORMAL
LYMPHOCYTES # BLD AUTO: 1 K/UL — SIGNIFICANT CHANGE UP (ref 1–3.3)
LYMPHOCYTES # BLD AUTO: 7 % — LOW (ref 13–44)
MCHC RBC-ENTMCNC: 29 GM/DL — LOW (ref 32–36)
MCHC RBC-ENTMCNC: 29.6 PG — SIGNIFICANT CHANGE UP (ref 27–34)
MCV RBC AUTO: 101.8 FL — HIGH (ref 80–100)
MONOCYTES # BLD AUTO: 0.6 K/UL — SIGNIFICANT CHANGE UP (ref 0–0.9)
MONOCYTES NFR BLD AUTO: 4.6 % — SIGNIFICANT CHANGE UP (ref 2–14)
NEUTROPHILS # BLD AUTO: 12.1 K/UL — HIGH (ref 1.8–7.4)
NEUTROPHILS NFR BLD AUTO: 87 % — HIGH (ref 43–77)
NITRITE UR-MCNC: POSITIVE
PH UR: 5 — SIGNIFICANT CHANGE UP (ref 5–8)
PLATELET # BLD AUTO: 143 K/UL — LOW (ref 150–400)
POTASSIUM SERPL-MCNC: 5.4 MMOL/L — HIGH (ref 3.5–5.3)
POTASSIUM SERPL-SCNC: 5.4 MMOL/L — HIGH (ref 3.5–5.3)
PROT SERPL-MCNC: 8.1 G/DL — SIGNIFICANT CHANGE UP (ref 6–8.3)
PROT UR-MCNC: 100
RBC # BLD: 2.71 M/UL — LOW (ref 3.8–5.2)
RBC # FLD: 15.1 % — HIGH (ref 10.3–14.5)
SODIUM SERPL-SCNC: 137 MMOL/L — SIGNIFICANT CHANGE UP (ref 135–145)
SP GR SPEC: 1.01 — SIGNIFICANT CHANGE UP (ref 1.01–1.02)
TROPONIN I SERPL-MCNC: 0.05 NG/ML — HIGH (ref 0–0.04)
UROBILINOGEN FLD QL: NEGATIVE — SIGNIFICANT CHANGE UP
WBC # BLD: 13.9 K/UL — HIGH (ref 3.8–10.5)
WBC # FLD AUTO: 13.9 K/UL — HIGH (ref 3.8–10.5)

## 2018-03-25 PROCEDURE — 99285 EMERGENCY DEPT VISIT HI MDM: CPT

## 2018-03-25 PROCEDURE — 71045 X-RAY EXAM CHEST 1 VIEW: CPT | Mod: 26

## 2018-03-25 RX ORDER — ACETAMINOPHEN 500 MG
650 TABLET ORAL EVERY 6 HOURS
Qty: 0 | Refills: 0 | Status: DISCONTINUED | OUTPATIENT
Start: 2018-03-25 | End: 2018-03-27

## 2018-03-25 RX ORDER — CEFTRIAXONE 500 MG/1
1 INJECTION, POWDER, FOR SOLUTION INTRAMUSCULAR; INTRAVENOUS ONCE
Qty: 0 | Refills: 0 | Status: COMPLETED | OUTPATIENT
Start: 2018-03-25 | End: 2018-03-26

## 2018-03-25 RX ORDER — ROBINUL 0.2 MG/ML
0.2 INJECTION INTRAMUSCULAR; INTRAVENOUS EVERY 4 HOURS
Qty: 0 | Refills: 0 | Status: DISCONTINUED | OUTPATIENT
Start: 2018-03-25 | End: 2018-03-27

## 2018-03-25 RX ORDER — SENNA PLUS 8.6 MG/1
2 TABLET ORAL AT BEDTIME
Qty: 0 | Refills: 0 | Status: DISCONTINUED | OUTPATIENT
Start: 2018-03-25 | End: 2018-03-27

## 2018-03-25 RX ORDER — DOCUSATE SODIUM 100 MG
100 CAPSULE ORAL
Qty: 0 | Refills: 0 | Status: DISCONTINUED | OUTPATIENT
Start: 2018-03-25 | End: 2018-03-26

## 2018-03-25 RX ORDER — ASPIRIN/CALCIUM CARB/MAGNESIUM 324 MG
325 TABLET ORAL ONCE
Qty: 0 | Refills: 0 | Status: COMPLETED | OUTPATIENT
Start: 2018-03-25 | End: 2018-03-26

## 2018-03-25 RX ORDER — IPRATROPIUM/ALBUTEROL SULFATE 18-103MCG
3 AEROSOL WITH ADAPTER (GRAM) INHALATION EVERY 6 HOURS
Qty: 0 | Refills: 0 | Status: DISCONTINUED | OUTPATIENT
Start: 2018-03-25 | End: 2018-03-27

## 2018-03-25 RX ORDER — SODIUM CHLORIDE 9 MG/ML
1000 INJECTION INTRAMUSCULAR; INTRAVENOUS; SUBCUTANEOUS
Qty: 0 | Refills: 0 | Status: DISCONTINUED | OUTPATIENT
Start: 2018-03-25 | End: 2018-03-27

## 2018-03-25 RX ORDER — PANTOPRAZOLE SODIUM 20 MG/1
40 TABLET, DELAYED RELEASE ORAL DAILY
Qty: 0 | Refills: 0 | Status: DISCONTINUED | OUTPATIENT
Start: 2018-03-25 | End: 2018-03-27

## 2018-03-25 RX ORDER — AMPICILLIN SODIUM AND SULBACTAM SODIUM 250; 125 MG/ML; MG/ML
3 INJECTION, POWDER, FOR SUSPENSION INTRAMUSCULAR; INTRAVENOUS ONCE
Qty: 0 | Refills: 0 | Status: COMPLETED | OUTPATIENT
Start: 2018-03-25 | End: 2018-03-25

## 2018-03-25 RX ORDER — MORPHINE SULFATE 50 MG/1
0.5 CAPSULE, EXTENDED RELEASE ORAL
Qty: 0 | Refills: 0 | Status: DISCONTINUED | OUTPATIENT
Start: 2018-03-25 | End: 2018-03-27

## 2018-03-25 RX ADMIN — AMPICILLIN SODIUM AND SULBACTAM SODIUM 200 GRAM(S): 250; 125 INJECTION, POWDER, FOR SUSPENSION INTRAMUSCULAR; INTRAVENOUS at 21:46

## 2018-03-25 NOTE — ED PROVIDER NOTE - OBJECTIVE STATEMENT
86 y/o F w/ PMHx of DM, COPD, on home hospice, DNR/DNI, here w/ fever and shortness of breath x 2 days, w/ t-max 102F. Pt has chronic pop, changed 2 weeks ago.

## 2018-03-25 NOTE — ED PROVIDER NOTE - MEDICAL DECISION MAKING DETAILS
84 y/o F, DNR/DNI, febrile to 102F. Will do sepsis work up, check chest x-ray, labs, discuss w/ hospice team for admission.

## 2018-03-25 NOTE — ED ADULT TRIAGE NOTE - CHIEF COMPLAINT QUOTE
BIBA c/o sob x 2 days and on and off fever x 4 days and a hospice patient as per daughter,came with pop catheter draining to a white yellow color

## 2018-03-26 RX ORDER — DOCUSATE SODIUM 100 MG
100 CAPSULE ORAL
Qty: 0 | Refills: 0 | Status: DISCONTINUED | OUTPATIENT
Start: 2018-03-26 | End: 2018-03-27

## 2018-03-26 RX ORDER — PIPERACILLIN AND TAZOBACTAM 4; .5 G/20ML; G/20ML
3.38 INJECTION, POWDER, LYOPHILIZED, FOR SOLUTION INTRAVENOUS EVERY 8 HOURS
Qty: 0 | Refills: 0 | Status: DISCONTINUED | OUTPATIENT
Start: 2018-03-26 | End: 2018-03-27

## 2018-03-26 RX ADMIN — Medication 3 MILLILITER(S): at 01:13

## 2018-03-26 RX ADMIN — CEFTRIAXONE 100 GRAM(S): 500 INJECTION, POWDER, FOR SOLUTION INTRAMUSCULAR; INTRAVENOUS at 01:09

## 2018-03-26 RX ADMIN — PIPERACILLIN AND TAZOBACTAM 25 GRAM(S): 4; .5 INJECTION, POWDER, LYOPHILIZED, FOR SOLUTION INTRAVENOUS at 07:32

## 2018-03-26 RX ADMIN — MORPHINE SULFATE 0.5 MILLIGRAM(S): 50 CAPSULE, EXTENDED RELEASE ORAL at 19:10

## 2018-03-26 RX ADMIN — SODIUM CHLORIDE 40 MILLILITER(S): 9 INJECTION INTRAMUSCULAR; INTRAVENOUS; SUBCUTANEOUS at 01:09

## 2018-03-26 RX ADMIN — PANTOPRAZOLE SODIUM 40 MILLIGRAM(S): 20 TABLET, DELAYED RELEASE ORAL at 17:54

## 2018-03-26 RX ADMIN — SENNA PLUS 2 TABLET(S): 8.6 TABLET ORAL at 22:56

## 2018-03-26 RX ADMIN — Medication 3 MILLILITER(S): at 08:25

## 2018-03-26 RX ADMIN — PIPERACILLIN AND TAZOBACTAM 25 GRAM(S): 4; .5 INJECTION, POWDER, LYOPHILIZED, FOR SOLUTION INTRAVENOUS at 17:55

## 2018-03-26 RX ADMIN — MORPHINE SULFATE 0.5 MILLIGRAM(S): 50 CAPSULE, EXTENDED RELEASE ORAL at 23:23

## 2018-03-26 RX ADMIN — ROBINUL 0.2 MILLIGRAM(S): 0.2 INJECTION INTRAMUSCULAR; INTRAVENOUS at 18:30

## 2018-03-26 RX ADMIN — PIPERACILLIN AND TAZOBACTAM 25 GRAM(S): 4; .5 INJECTION, POWDER, LYOPHILIZED, FOR SOLUTION INTRAVENOUS at 22:56

## 2018-03-26 RX ADMIN — MORPHINE SULFATE 0.5 MILLIGRAM(S): 50 CAPSULE, EXTENDED RELEASE ORAL at 18:30

## 2018-03-26 RX ADMIN — Medication 325 MILLIGRAM(S): at 01:31

## 2018-03-26 RX ADMIN — MORPHINE SULFATE 0.5 MILLIGRAM(S): 50 CAPSULE, EXTENDED RELEASE ORAL at 23:40

## 2018-03-26 RX ADMIN — Medication 3 MILLILITER(S): at 17:55

## 2018-03-26 NOTE — H&P ADULT - HISTORY OF PRESENT ILLNESS
85 year old female with hospice diagnosis of advanced COPD on home hospice program with increasing dyspnea that could not be effectively managed with oral medications.  The patient was admitted to the IPU at [facility] for IV management of dyspnea.

## 2018-03-26 NOTE — H&P ADULT - ASSESSMENT
85 year old female with hospice diagnosis of advanced COPD admitted to the IPU at [facility] for IV management of dyspnea.

## 2018-03-26 NOTE — H&P ADULT - NSHPPHYSICALEXAM_GEN_ALL_CORE
thin female in mild respiratory distress  89/42 66 99.8 18 100%  HEENT: moderate bitemporal wasting  Neck: no JVD no nodes no thyromegaly  Lungs: decreased breath sounds and rales at bases  Heart: s1s2 no audible murmurs  Abd: soft ND NT no masses no organomegaly, GT  Ext: no edema no redness

## 2018-03-27 VITALS
DIASTOLIC BLOOD PRESSURE: 98 MMHG | SYSTOLIC BLOOD PRESSURE: 125 MMHG | OXYGEN SATURATION: 93 % | RESPIRATION RATE: 16 BRPM | HEART RATE: 56 BPM | TEMPERATURE: 99 F

## 2018-03-27 DIAGNOSIS — L98.9 DISORDER OF THE SKIN AND SUBCUTANEOUS TISSUE, UNSPECIFIED: ICD-10-CM

## 2018-03-27 DIAGNOSIS — J44.9 CHRONIC OBSTRUCTIVE PULMONARY DISEASE, UNSPECIFIED: ICD-10-CM

## 2018-03-27 DIAGNOSIS — R06.00 DYSPNEA, UNSPECIFIED: ICD-10-CM

## 2018-03-27 DIAGNOSIS — K11.7 DISTURBANCES OF SALIVARY SECRETION: ICD-10-CM

## 2018-03-27 DIAGNOSIS — K59.03 DRUG INDUCED CONSTIPATION: ICD-10-CM

## 2018-03-27 DIAGNOSIS — R41.0 DISORIENTATION, UNSPECIFIED: ICD-10-CM

## 2018-03-27 DIAGNOSIS — R52 PAIN, UNSPECIFIED: ICD-10-CM

## 2018-03-27 DIAGNOSIS — N30.90 CYSTITIS, UNSPECIFIED WITHOUT HEMATURIA: ICD-10-CM

## 2018-03-27 LAB
CULTURE RESULTS: SIGNIFICANT CHANGE UP
SPECIMEN SOURCE: SIGNIFICANT CHANGE UP

## 2018-03-27 PROCEDURE — 81001 URINALYSIS AUTO W/SCOPE: CPT

## 2018-03-27 PROCEDURE — 99285 EMERGENCY DEPT VISIT HI MDM: CPT | Mod: 25

## 2018-03-27 PROCEDURE — 93005 ELECTROCARDIOGRAM TRACING: CPT

## 2018-03-27 PROCEDURE — 85027 COMPLETE CBC AUTOMATED: CPT

## 2018-03-27 PROCEDURE — 87086 URINE CULTURE/COLONY COUNT: CPT

## 2018-03-27 PROCEDURE — 94640 AIRWAY INHALATION TREATMENT: CPT

## 2018-03-27 PROCEDURE — 82962 GLUCOSE BLOOD TEST: CPT

## 2018-03-27 PROCEDURE — 71045 X-RAY EXAM CHEST 1 VIEW: CPT

## 2018-03-27 PROCEDURE — 87040 BLOOD CULTURE FOR BACTERIA: CPT

## 2018-03-27 PROCEDURE — 84484 ASSAY OF TROPONIN QUANT: CPT

## 2018-03-27 PROCEDURE — 80053 COMPREHEN METABOLIC PANEL: CPT

## 2018-03-27 RX ADMIN — MORPHINE SULFATE 0.5 MILLIGRAM(S): 50 CAPSULE, EXTENDED RELEASE ORAL at 13:18

## 2018-03-27 RX ADMIN — MORPHINE SULFATE 0.5 MILLIGRAM(S): 50 CAPSULE, EXTENDED RELEASE ORAL at 17:30

## 2018-03-27 RX ADMIN — Medication 650 MILLIGRAM(S): at 13:31

## 2018-03-27 RX ADMIN — MORPHINE SULFATE 0.5 MILLIGRAM(S): 50 CAPSULE, EXTENDED RELEASE ORAL at 13:38

## 2018-03-27 RX ADMIN — PIPERACILLIN AND TAZOBACTAM 25 GRAM(S): 4; .5 INJECTION, POWDER, LYOPHILIZED, FOR SOLUTION INTRAVENOUS at 05:46

## 2018-03-27 RX ADMIN — MORPHINE SULFATE 0.5 MILLIGRAM(S): 50 CAPSULE, EXTENDED RELEASE ORAL at 17:10

## 2018-03-27 RX ADMIN — PANTOPRAZOLE SODIUM 40 MILLIGRAM(S): 20 TABLET, DELAYED RELEASE ORAL at 13:18

## 2018-03-27 RX ADMIN — MORPHINE SULFATE 0.5 MILLIGRAM(S): 50 CAPSULE, EXTENDED RELEASE ORAL at 02:17

## 2018-03-27 RX ADMIN — Medication 3 MILLILITER(S): at 15:01

## 2018-03-27 RX ADMIN — Medication 100 MILLIGRAM(S): at 05:46

## 2018-03-27 RX ADMIN — PIPERACILLIN AND TAZOBACTAM 25 GRAM(S): 4; .5 INJECTION, POWDER, LYOPHILIZED, FOR SOLUTION INTRAVENOUS at 13:19

## 2018-03-27 RX ADMIN — MORPHINE SULFATE 0.5 MILLIGRAM(S): 50 CAPSULE, EXTENDED RELEASE ORAL at 02:30

## 2018-03-27 RX ADMIN — Medication 3 MILLILITER(S): at 21:02

## 2018-03-27 RX ADMIN — Medication 3 MILLILITER(S): at 10:09

## 2018-03-27 NOTE — DISCHARGE NOTE FOR THE EXPIRED PATIENT - HOSPITAL COURSE
85 year old female with hospice diagnosis of advanced COPD on home hospice program with increasing dyspnea that could not be effectively managed with oral medications.  The patient was admitted to the IPU at [facility] for IV management of dyspnea.     patient was inpatient hospice care, called by nursing staff since patient was unresponsive, On exam patient did not respond to verbal or physical stimuli, Absent heart and beath sounds, absent peripheral pulses. pupils are fixed and dilated. patient was pronounced dead on 3/27/2018 at 10; 52 pm. patient daughter Beth Brooks at bed side, family declined autopsy  Attending physician notified, Organ donation team notified.

## 2018-03-27 NOTE — ADVANCED PRACTICE NURSE CONSULT - ASSESSMENT
This is a 85yr old female patient admitted for Fever, to which a Stage 2 Pressure Injury was documented on the Sacrum. There is a Wound Care Specialist consultation for this particular site. In an interdisciplinary team meeting with the Nurse Manager and Physician, it was determined that a wound care consultation would not be appropriate at this time. The patient is currently on Hospice Care. The patient is currently maintained on a low-air loss bed with pressure injury prevention resources present

## 2018-03-27 NOTE — PROGRESS NOTE ADULT - SUBJECTIVE AND OBJECTIVE BOX
YEYO LUPE                    85y  Female    Allergies    codeine (Rash)    Intolerances        Symptoms:  Pain (1-10): 0  Dyspnea: 0  Nausea/Vomitin  Secretions: 0  Agitation: 0  Symptom Requiring Inpatient Hospice Admission: dyspnea    Overnight events/interim history: morphine given times 3 for dyspnea with improvement, no pain on evaluation, maxillary breathing is noted    HPI:  85 year old female with hospice diagnosis of advanced COPD on home hospice program with increasing dyspnea that could not be effectively managed with oral medications.  The patient was admitted to the IPU at [facility] for IV management of dyspnea. (26 Mar 2018 21:00)          PPSV2: 10%    Code Status: DNR          MEDICATIONS  (STANDING):    MEDICATIONS  (PRN):                             Vital Signs Last 24 Hrs  T(C): 37.3 (27 Mar 2018 15:51), Max: 39.2 (27 Mar 2018 13:30)  T(F): 99.1 (27 Mar 2018 15:51), Max: 102.5 (27 Mar 2018 13:30)  HR: 56 (27 Mar 2018 15:51) (56 - 56)  BP: 125/98 (27 Mar 2018 15:51) (125/98 - 125/98)  BP(mean): --  RR: 16 (27 Mar 2018 15:51) (16 - 16)  SpO2: 93% (27 Mar 2018 15:51) (93% - 93%)    PHYSICAL EXAM:    thin female in no respiratory distress  126/98 56 99.1 16 93%  HEENT: moderate bitemporal wasting  Neck: no JVD no nodes no thyromegaly  Lungs: decreased breath sounds and rales at bases  Heart: s1s2 no audible murmurs  Abd: soft ND NT no masses no organomegaly, GT  Ext: no edema no redness

## 2018-03-27 NOTE — PROGRESS NOTE ADULT - ASSESSMENT
85 year old female with hospice diagnosis of advanced COPD admitted to the IPU at [facility] for IV management of dyspnea.

## 2018-03-31 LAB
CULTURE RESULTS: SIGNIFICANT CHANGE UP
CULTURE RESULTS: SIGNIFICANT CHANGE UP
SPECIMEN SOURCE: SIGNIFICANT CHANGE UP
SPECIMEN SOURCE: SIGNIFICANT CHANGE UP

## 2021-06-09 NOTE — ED ADULT NURSE NOTE - CHPI ED SYMPTOMS NEG
no vomiting/no nausea/no pain If you are a smoker, it is important for your health to stop smoking. Please be aware that second hand smoke is also harmful.

## 2021-12-02 NOTE — CONSULT NOTE ADULT - CONSULT REQUESTED DATE/TIME
26-May-2017 14:00
Jackie Henderson  PEDIATRIC PULMONARY MEDICINE  [address] [place] [address]  Phone: [phone]  Fax: [phone]  Follow Up Time: 1 week

## 2022-03-29 NOTE — PRE-OP CHECKLIST - IV STARTED
Care Due:                  Date            Visit Type   Department     Provider  --------------------------------------------------------------------------------    Last Visit: None Found      None         None Found  Next Visit: None Scheduled  None         None Found                                                            Last  Test          Frequency    Reason                     Performed    Due Date  --------------------------------------------------------------------------------    Office Visit  12 months..  allopurinoL,               Not Found    Overdue                             chlorthalidone...........    CBC.........  12 months..  allopurinoL..............  12- 11-    Uric Acid...  12 months..  allopurinoL..............  Not Found    Overdue    Powered by [email or web address]. Reference number: [id number].   3/29/2022 12:13:27 AM CDT  
left arm/yes
yes/ivhl

## 2022-07-25 NOTE — H&P ADULT - NSHPSOURCEINFORD_GEN_ALL_CORE
Call back received. Patient inquiring about her recently re-starting Angeliq (HRT) 4 weeks ago. States she feels so much better since.  Will this improve bone density? Should she take both Angeliq and Fosamax? Patient hesitant to start another medication.   Chart(s)

## 2022-11-14 NOTE — ED PROVIDER NOTE - CONSTITUTIONAL [+], MLM
Last visit: 9/19/22  Next visit: 3/20/2023  Medication requested: Adderall  Last prescription details:  10/18/22, #30 with 0 refills.   Patient due for refills.      FEVER

## 2023-11-27 NOTE — ED ADULT NURSE NOTE - CAS ELECT INFOMATION PROVIDED
Deepa Morelos is a 51 year old female presenting today for  Physical exam  Denies known Latex allergy or symptoms of Latex sensitivity.  Medications verified, no changes.  Pharmacy verified  Social History     Tobacco Use   Smoking Status Never   Smokeless Tobacco Never     Health Maintenance Due   Topic Date Due   • Hepatitis B Vaccine (1 of 3 - 3-dose series) Never done   • Colorectal Cancer Screen-  Never done   • Shingles Vaccine (1 of 2) Never done   • Depression Screening  08/22/2023   • Influenza Vaccine (1) Never done   • COVID-19 Vaccine (4 - 2023-24 season) 09/01/2023       Patient is due for the topics as listed above and wishes to proceed with them. Orders placed for Colorectal Cancer Screening: Colonoscopy.                 DC instructions

## 2025-05-06 NOTE — ADVANCED PRACTICE NURSE CONSULT - RECOMMEDATIONS
Protocol For Photochemotherapy: Triamcinolone Ointment And Nbuvb: The patient received Photochemotherapy: Triamcinolone and NBUVB (triamcinolone ointment applied to all lesions prior to phototherapy).
Protocol For Photochemotherapy: Mineral Oil And Broad Band Uvb: The patient received Photochemotherapy: Mineral Oil and Broad Band UVB.
-Continue to elevate/float the patients heels using heel protectors and reposition the patient Q 2hrs using wedges or pillows
Comments On Previous Treatment: Has goggles, no burning. Last tx was intense so we took dosage down.
Protocol For Photochemotherapy For Severe Photoresponsive Dermatoses: Puva: The patient received Photochemotherapy for severe photoresponsive dermatoses: PUVA requiring at least 4 to 8 hours of care under direct physician supervision.
Protocol For Uva: The patient received UVA.
Protocol For Nbuvb: Hands/Feet: The patient received NBUVB.
Protocol For Photochemotherapy For Severe Photoresponsive Dermatoses: Tar And Broad Band Uvb (Goeckerman Treatment): The patient received Photochemotherapy for severe photoresponsive dermatoses: Tar and Broad Band UVB (Goeckerman treatment) requiring at least 4 to 8 hours of care under direct physician supervision.
Protocol: NBUVB
Changes In Treatment Protocol: Start at 225mj, increase 25mj each tx until 375 2-3 x weekly per Kathleen.\\nPt asked to stay at 325mj today 5/6/25
Protocol For Photochemotherapy For Severe Photoresponsive Dermatoses: Tar And Nbuvb (Goeckerman Treatment): The patient received Photochemotherapy for severe photoresponsive dermatoses: Tar and NBUVB (Goeckerman treatment) requiring at least 4 to 8 hours of care under direct physician supervision.
Total Body Time: 2:51
Protocol For Broad Band Uvb: The patient received Broad Band UVB.
Protocol For Uva1: The patient received UVA1.
Protocol For Photochemotherapy: Tar And Nbuvb (Goeckerman Treatment): The patient received Photochemotherapy: Tar and NBUVB (Goeckerman treatment).
Detail Level: Zone
Protocol For Bath Puva: The patient received Bath PUVA.
Post-Care Instructions: I reviewed with the patient in detail post-care instructions. Patient is to wear sun protection. Patients may expect sunburn like redness, discomfort and scabbing.
Protocol For Photochemotherapy For Severe Photoresponsive Dermatoses: Petrolatum And Nbuvb: The patient received Photochemotherapy for severe photoresponsive dermatoses: Petrolatum and NBUVB requiring at least 4 to 8 hours of care under direct physician supervision.
Protocol For Photochemotherapy: Baby Oil And Nbuvb: The patient received Photochemotherapy: Baby Oil and NBUVB (baby oil applied to all lesions prior to phototherapy).
Protocol For Photochemotherapy: Petrolatum And Nbuvb: The patient received Photochemotherapy: Petrolatum and NBUVB (petrolatum applied to all lesions prior to phototherapy).
Protocol For Protocol For Photochemotherapy For Severe Photoresponsive Dermatoses: Bath Puva: The patient received Photochemotherapy for severe photoresponsive dermatoses: Bath PUVA requiring at least 4 to 8 hours of care under direct physician supervision.
Protocol For Photochemotherapy For Severe Photoresponsive Dermatoses: Petrolatum And Broad Band Uvb: The patient received Photochemotherapyfor severe photoresponsive dermatoses: Petrolatum and Broad Band UVB requiring at least 4 to 8 hours of care under direct physician supervision.
Protocol For Photochemotherapy: Mineral Oil And Nbuvb: The patient received Photochemotherapy: Mineral Oil and NBUVB (mineral oil applied to all lesions prior to phototherapy).
Render Post-Care In The Note: no
Protocol For Photochemotherapy: Petrolatum And Broad Band Uvb: The patient received Photochemotherapy: Petrolatum and Broad Band UVB.
Consent: Written consent obtained.  The risks were reviewed with the patient including but not limited to: burn, pigmentary changes, pain, blistering, scabbing, redness, increased risk of skin cancers, and the remote possibility of scarring.
Total Body Energy: 325mj
Protocol For Photochemotherapy: Tar And Broad Band Uvb (Goeckerman Treatment): The patient received Photochemotherapy: Tar and Broad Band UVB (Goeckerman treatment).
Name Of Supervising Technician: MA
Protocol For Puva: The patient received PUVA.
Protocol For Nb Uva: The patient received NB UVA.